# Patient Record
Sex: MALE | URBAN - METROPOLITAN AREA
[De-identification: names, ages, dates, MRNs, and addresses within clinical notes are randomized per-mention and may not be internally consistent; named-entity substitution may affect disease eponyms.]

---

## 2022-12-08 ENCOUNTER — TELEPHONE ENCOUNTER (OUTPATIENT)
Dept: URBAN - METROPOLITAN AREA CLINIC 35 | Facility: CLINIC | Age: 47
End: 2022-12-08

## 2023-01-12 ENCOUNTER — DASHBOARD ENCOUNTERS (OUTPATIENT)
Age: 48
End: 2023-01-12

## 2023-01-12 ENCOUNTER — OFFICE VISIT (OUTPATIENT)
Dept: URBAN - METROPOLITAN AREA CLINIC 33 | Facility: CLINIC | Age: 48
End: 2023-01-12
Payer: COMMERCIAL

## 2023-01-12 VITALS
DIASTOLIC BLOOD PRESSURE: 82 MMHG | HEART RATE: 83 BPM | SYSTOLIC BLOOD PRESSURE: 124 MMHG | OXYGEN SATURATION: 98 % | HEIGHT: 68 IN | WEIGHT: 206.8 LBS | BODY MASS INDEX: 31.34 KG/M2

## 2023-01-12 DIAGNOSIS — R14.3 FLATULENCE: ICD-10-CM

## 2023-01-12 DIAGNOSIS — Z12.11 ENCOUNTER FOR COLORECTAL CANCER SCREENING: ICD-10-CM

## 2023-01-12 PROBLEM — 249504006: Status: ACTIVE | Noted: 2023-01-12

## 2023-01-12 PROCEDURE — 99204 OFFICE O/P NEW MOD 45 MIN: CPT | Performed by: INTERNAL MEDICINE

## 2023-01-12 RX ORDER — SODIUM, POTASSIUM,MAG SULFATES 17.5-3.13G
177ML SOLUTION, RECONSTITUTED, ORAL ORAL
Qty: 354 MILLILITER | Refills: 0 | OUTPATIENT
Start: 2023-01-12 | End: 2023-01-20

## 2023-01-12 NOTE — HPI-BLOATING/GAS
Patient admits symptoms of bloating/gas. He states symptoms started about 8-9 months ago. Patient states his symptoms are more present in the evening. Patiet states symptoms are not constant and are not present all the time. Denies taking any OTC or prescribe medications for relief.

## 2023-01-12 NOTE — HPI-COLORECTAL CANCER SCREENING
48 y/o male patient presents today for a colorectal cancer screening. Patient admits this will be his first colonoscopy. He denies a family history of colon, gastric, or esophageal cancer/polyps.   Currently reports 1 bowel movements a day without strain. His stools are normal without blood, mucus, or melena. He denies any episodes of rectal pain or pruritus ani.

## 2023-01-12 NOTE — PHYSICAL EXAM GASTROINTESTINAL
Abdomen , soft, nontender, nondistended , no guarding or rigidity , no masses palpable , normal bowel sounds , Liver and Spleen,  no hepatosplenomegaly , liver nontender. Perianal cyst

## 2023-01-13 PROBLEM — 275978004 SCREENING FOR MALIGNANT NEOPLASM OF COLON: Status: ACTIVE | Noted: 2023-01-12

## 2023-01-18 ENCOUNTER — TELEPHONE ENCOUNTER (OUTPATIENT)
Dept: URBAN - METROPOLITAN AREA CLINIC 33 | Facility: CLINIC | Age: 48
End: 2023-01-18

## 2023-01-18 RX ORDER — SODIUM, POTASSIUM,MAG SULFATES 17.5-3.13G
177ML SOLUTION, RECONSTITUTED, ORAL ORAL
Qty: 354 MILLILITER | Refills: 0
Start: 2023-01-12 | End: 2023-01-21

## 2023-01-23 ENCOUNTER — TELEPHONE ENCOUNTER (OUTPATIENT)
Dept: URBAN - METROPOLITAN AREA CLINIC 33 | Facility: CLINIC | Age: 48
End: 2023-01-23

## 2023-01-25 ENCOUNTER — CLAIMS CREATED FROM THE CLAIM WINDOW (OUTPATIENT)
Dept: URBAN - METROPOLITAN AREA SURGERY CENTER 8 | Facility: SURGERY CENTER | Age: 48
End: 2023-01-25
Payer: COMMERCIAL

## 2023-01-25 ENCOUNTER — OFFICE VISIT (OUTPATIENT)
Dept: URBAN - METROPOLITAN AREA SURGERY CENTER 8 | Facility: SURGERY CENTER | Age: 48
End: 2023-01-25

## 2023-01-25 ENCOUNTER — CLAIMS CREATED FROM THE CLAIM WINDOW (OUTPATIENT)
Dept: URBAN - METROPOLITAN AREA CLINIC 4 | Facility: CLINIC | Age: 48
End: 2023-01-25
Payer: COMMERCIAL

## 2023-01-25 DIAGNOSIS — Z12.11 COLON CANCER SCREENING: ICD-10-CM

## 2023-01-25 DIAGNOSIS — K51.919 ULCERATIVE COLITIS, UNSPECIFIED WITH UNSPECIFIED COMPLICATIONS: ICD-10-CM

## 2023-01-25 DIAGNOSIS — K51.40 INFLAMMATORY POLYP OF COLON: ICD-10-CM

## 2023-01-25 DIAGNOSIS — D12.8 ADENOMATOUS POLYP OF RECTUM: ICD-10-CM

## 2023-01-25 DIAGNOSIS — D12.4 ADENOMA OF DESCENDING COLON: ICD-10-CM

## 2023-01-25 DIAGNOSIS — K51.00 ACUTE ULCERATIVE PANCOLITIS: ICD-10-CM

## 2023-01-25 PROCEDURE — 45380 COLONOSCOPY AND BIOPSY: CPT | Performed by: INTERNAL MEDICINE

## 2023-01-25 PROCEDURE — 88305 TISSUE EXAM BY PATHOLOGIST: CPT | Performed by: PATHOLOGY

## 2023-01-25 PROCEDURE — 88342 IMHCHEM/IMCYTCHM 1ST ANTB: CPT | Performed by: PATHOLOGY

## 2023-01-25 PROCEDURE — G8907 PT DOC NO EVENTS ON DISCHARG: HCPCS | Performed by: INTERNAL MEDICINE

## 2023-01-25 PROCEDURE — 45385 COLONOSCOPY W/LESION REMOVAL: CPT | Performed by: INTERNAL MEDICINE

## 2023-02-23 ENCOUNTER — OFFICE VISIT (OUTPATIENT)
Dept: URBAN - METROPOLITAN AREA CLINIC 33 | Facility: CLINIC | Age: 48
End: 2023-02-23

## 2023-02-23 NOTE — HPI-BLOATING/GAS
He admits/denies improvement in symptoms since last visit. Patient reports ---.   Last visit 01/12/2023 Patient admits symptoms of bloating/gas. He states symptoms started about 8-9 months ago. Patient states his symptoms are more present in the evening. Patiet states symptoms are not constant and are not present all the time. Denies taking any OTC or prescribe medications for relief.

## 2023-02-23 NOTE — HPI-COLORECTAL CANCER SCREENING
Patient presents today for a follow up from his colonoscopy.  He admits/denies any complications after his/her procedure. He/She currently reports -- bowel movements per --, with/without strain. His/Her stools are -- and --. She/He admits/denies any  mucus, melena or blood in stools. Admits/Denies any pruritus ani or rectal pain.    Last visit 01/12/2023 46 y/o male patient presents today for a colorectal cancer screening. Patient admits this will be his first colonoscopy. He denies a family history of colon, gastric, or esophageal cancer/polyps.   Currently reports 1 bowel movements a day without strain. His stools are normal without blood, mucus, or melena. He denies any episodes of rectal pain or pruritus ani.